# Patient Record
Sex: FEMALE | URBAN - METROPOLITAN AREA
[De-identification: names, ages, dates, MRNs, and addresses within clinical notes are randomized per-mention and may not be internally consistent; named-entity substitution may affect disease eponyms.]

---

## 2017-02-15 ENCOUNTER — TELEPHONE (OUTPATIENT)
Dept: TRANSPLANT | Facility: CLINIC | Age: 37
End: 2017-02-15

## 2017-02-15 NOTE — TELEPHONE ENCOUNTER
Kim Mendieta called and stated that she is interested in becoming a living donor for Chelita Coley, who she heard advertise on the radio.  Medical and social history obtained.  No contraindications noted.  All questions answered.  Education material emailed to patient for review. Instructed to contact me if she would like to be tested after reviewing the information. Patient agreed.